# Patient Record
Sex: FEMALE | Race: OTHER | NOT HISPANIC OR LATINO | Employment: FULL TIME | URBAN - METROPOLITAN AREA
[De-identification: names, ages, dates, MRNs, and addresses within clinical notes are randomized per-mention and may not be internally consistent; named-entity substitution may affect disease eponyms.]

---

## 2024-01-04 ENCOUNTER — HOSPITAL ENCOUNTER (EMERGENCY)
Facility: HOSPITAL | Age: 19
Discharge: PSYCHIATRIC OR SUBSTANCE USE FACILITY | End: 2024-01-05
Attending: EMERGENCY MEDICINE
Payer: COMMERCIAL

## 2024-01-04 VITALS
SYSTOLIC BLOOD PRESSURE: 126 MMHG | TEMPERATURE: 98 F | DIASTOLIC BLOOD PRESSURE: 76 MMHG | OXYGEN SATURATION: 99 % | HEIGHT: 58 IN | HEART RATE: 68 BPM | RESPIRATION RATE: 20 BRPM | WEIGHT: 86 LBS | BODY MASS INDEX: 18.05 KG/M2

## 2024-01-04 DIAGNOSIS — R45.851 SUICIDAL IDEATION: Primary | ICD-10-CM

## 2024-01-04 LAB
ALBUMIN SERPL-MCNC: 4.7 G/DL (ref 3.5–5)
ALBUMIN/GLOB SERPL: 1.5 RATIO (ref 1.1–2)
ALP SERPL-CCNC: 37 UNIT/L (ref 40–150)
ALT SERPL-CCNC: 9 UNIT/L (ref 0–55)
AMPHET UR QL SCN: NEGATIVE
APAP SERPL-MCNC: <17.4 UG/ML (ref 17.4–30)
APPEARANCE UR: CLEAR
AST SERPL-CCNC: 20 UNIT/L (ref 5–34)
B-HCG SERPL QL: NEGATIVE
BACTERIA #/AREA URNS AUTO: ABNORMAL /HPF
BARBITURATE SCN PRESENT UR: NEGATIVE
BASOPHILS # BLD AUTO: 0.03 X10(3)/MCL
BASOPHILS NFR BLD AUTO: 0.4 %
BENZODIAZ UR QL SCN: NEGATIVE
BILIRUB SERPL-MCNC: 1.7 MG/DL
BILIRUB UR QL STRIP.AUTO: NEGATIVE
BUN SERPL-MCNC: 21.5 MG/DL (ref 7–18.7)
CALCIUM SERPL-MCNC: 9.3 MG/DL (ref 8.4–10.2)
CANNABINOIDS UR QL SCN: NEGATIVE
CHLORIDE SERPL-SCNC: 105 MMOL/L (ref 98–107)
CO2 SERPL-SCNC: 15 MMOL/L (ref 22–29)
COCAINE UR QL SCN: NEGATIVE
COLOR UR AUTO: YELLOW
CREAT SERPL-MCNC: 0.76 MG/DL (ref 0.55–1.02)
EOSINOPHIL # BLD AUTO: 0 X10(3)/MCL (ref 0–0.9)
EOSINOPHIL NFR BLD AUTO: 0 %
ERYTHROCYTE [DISTWIDTH] IN BLOOD BY AUTOMATED COUNT: 11.4 % (ref 11.5–17)
ETHANOL SERPL-MCNC: <10 MG/DL
FENTANYL UR QL SCN: NEGATIVE
GFR SERPLBLD CREATININE-BSD FMLA CKD-EPI: >60 MLS/MIN/1.73/M2
GLOBULIN SER-MCNC: 3.1 GM/DL (ref 2.4–3.5)
GLUCOSE SERPL-MCNC: 64 MG/DL (ref 74–100)
GLUCOSE UR QL STRIP.AUTO: NORMAL
HCT VFR BLD AUTO: 42.7 % (ref 37–47)
HGB BLD-MCNC: 14.2 G/DL (ref 12–16)
IMM GRANULOCYTES # BLD AUTO: 0.03 X10(3)/MCL (ref 0–0.04)
IMM GRANULOCYTES NFR BLD AUTO: 0.4 %
KETONES UR QL STRIP.AUTO: ABNORMAL
LEUKOCYTE ESTERASE UR QL STRIP.AUTO: NEGATIVE
LYMPHOCYTES # BLD AUTO: 1.65 X10(3)/MCL (ref 0.6–4.6)
LYMPHOCYTES NFR BLD AUTO: 20.2 %
MCH RBC QN AUTO: 30.8 PG (ref 27–31)
MCHC RBC AUTO-ENTMCNC: 33.3 G/DL (ref 33–36)
MCV RBC AUTO: 92.6 FL (ref 80–94)
MDMA UR QL SCN: NEGATIVE
MONOCYTES # BLD AUTO: 0.44 X10(3)/MCL (ref 0.1–1.3)
MONOCYTES NFR BLD AUTO: 5.4 %
MUCOUS THREADS URNS QL MICRO: ABNORMAL /LPF
NEUTROPHILS # BLD AUTO: 6.02 X10(3)/MCL (ref 2.1–9.2)
NEUTROPHILS NFR BLD AUTO: 73.6 %
NITRITE UR QL STRIP.AUTO: NEGATIVE
NRBC BLD AUTO-RTO: 0 %
OPIATES UR QL SCN: NEGATIVE
PCP UR QL: NEGATIVE
PH UR STRIP.AUTO: 5.5 [PH]
PH UR: 5.5 [PH] (ref 3–11)
PLATELET # BLD AUTO: 226 X10(3)/MCL (ref 130–400)
PMV BLD AUTO: 12.7 FL (ref 7.4–10.4)
POCT GLUCOSE: 164 MG/DL (ref 70–110)
POTASSIUM SERPL-SCNC: 4.3 MMOL/L (ref 3.5–5.1)
PROT SERPL-MCNC: 7.8 GM/DL (ref 6.4–8.3)
PROT UR QL STRIP.AUTO: ABNORMAL
RBC # BLD AUTO: 4.61 X10(6)/MCL (ref 4.2–5.4)
RBC #/AREA URNS AUTO: ABNORMAL /HPF
RBC UR QL AUTO: NEGATIVE
SALICYLATES SERPL-MCNC: <5 MG/DL (ref 15–30)
SARS-COV-2 RDRP RESP QL NAA+PROBE: NEGATIVE
SODIUM SERPL-SCNC: 135 MMOL/L (ref 136–145)
SP GR UR STRIP.AUTO: 1.04 (ref 1–1.03)
SPECIFIC GRAVITY, URINE AUTO (.000) (OHS): 1.04 (ref 1–1.03)
SQUAMOUS #/AREA URNS LPF: ABNORMAL /HPF
TSH SERPL-ACNC: 0.69 UIU/ML (ref 0.35–4.94)
UROBILINOGEN UR STRIP-ACNC: NORMAL
WBC # SPEC AUTO: 8.17 X10(3)/MCL (ref 4.5–11.5)
WBC #/AREA URNS AUTO: ABNORMAL /HPF

## 2024-01-04 PROCEDURE — 80143 DRUG ASSAY ACETAMINOPHEN: CPT | Performed by: PHYSICIAN ASSISTANT

## 2024-01-04 PROCEDURE — 82077 ASSAY SPEC XCP UR&BREATH IA: CPT | Performed by: PHYSICIAN ASSISTANT

## 2024-01-04 PROCEDURE — 82962 GLUCOSE BLOOD TEST: CPT

## 2024-01-04 PROCEDURE — 85025 COMPLETE CBC W/AUTO DIFF WBC: CPT | Performed by: PHYSICIAN ASSISTANT

## 2024-01-04 PROCEDURE — 80053 COMPREHEN METABOLIC PANEL: CPT | Performed by: PHYSICIAN ASSISTANT

## 2024-01-04 PROCEDURE — 87635 SARS-COV-2 COVID-19 AMP PRB: CPT | Performed by: PHYSICIAN ASSISTANT

## 2024-01-04 PROCEDURE — 84443 ASSAY THYROID STIM HORMONE: CPT | Performed by: PHYSICIAN ASSISTANT

## 2024-01-04 PROCEDURE — 99285 EMERGENCY DEPT VISIT HI MDM: CPT | Mod: 25

## 2024-01-04 PROCEDURE — 81025 URINE PREGNANCY TEST: CPT | Performed by: PHYSICIAN ASSISTANT

## 2024-01-04 PROCEDURE — 81001 URINALYSIS AUTO W/SCOPE: CPT | Mod: XB | Performed by: PHYSICIAN ASSISTANT

## 2024-01-04 PROCEDURE — 80179 DRUG ASSAY SALICYLATE: CPT | Performed by: PHYSICIAN ASSISTANT

## 2024-01-04 PROCEDURE — 80307 DRUG TEST PRSMV CHEM ANLYZR: CPT | Performed by: PHYSICIAN ASSISTANT

## 2024-01-05 ENCOUNTER — HOSPITAL ENCOUNTER (INPATIENT)
Facility: HOSPITAL | Age: 19
LOS: 4 days | Discharge: HOME | DRG: 885 | End: 2024-01-09
Attending: PSYCHIATRY & NEUROLOGY | Admitting: PSYCHIATRY & NEUROLOGY
Payer: COMMERCIAL

## 2024-01-05 DIAGNOSIS — F32.A DEPRESSION: ICD-10-CM

## 2024-01-05 LAB
ALBUMIN SERPL-MCNC: 4.3 G/DL (ref 3.5–5)
ALBUMIN/GLOB SERPL: 1.6 RATIO (ref 1.1–2)
ALP SERPL-CCNC: 36 UNIT/L (ref 40–150)
ALT SERPL-CCNC: 8 UNIT/L (ref 0–55)
AST SERPL-CCNC: 16 UNIT/L (ref 5–34)
BASOPHILS # BLD AUTO: 0.03 X10(3)/MCL
BASOPHILS NFR BLD AUTO: 0.4 %
BILIRUB SERPL-MCNC: 1.5 MG/DL
BUN SERPL-MCNC: 21.1 MG/DL (ref 7–18.7)
CALCIUM SERPL-MCNC: 9.6 MG/DL (ref 8.4–10.2)
CHLORIDE SERPL-SCNC: 106 MMOL/L (ref 98–107)
CHOLEST SERPL-MCNC: 177 MG/DL
CHOLEST/HDLC SERPL: 3 {RATIO} (ref 0–5)
CO2 SERPL-SCNC: 24 MMOL/L (ref 22–29)
CREAT SERPL-MCNC: 0.85 MG/DL (ref 0.55–1.02)
EOSINOPHIL # BLD AUTO: 0.1 X10(3)/MCL (ref 0–0.9)
EOSINOPHIL NFR BLD AUTO: 1.3 %
ERYTHROCYTE [DISTWIDTH] IN BLOOD BY AUTOMATED COUNT: 11.3 % (ref 11.5–17)
EST. AVERAGE GLUCOSE BLD GHB EST-MCNC: 93.9 MG/DL
GFR SERPLBLD CREATININE-BSD FMLA CKD-EPI: >60 MLS/MIN/1.73/M2
GLOBULIN SER-MCNC: 2.7 GM/DL (ref 2.4–3.5)
GLUCOSE SERPL-MCNC: 75 MG/DL (ref 74–100)
HBA1C MFR BLD: 4.9 %
HCT VFR BLD AUTO: 40.8 % (ref 37–47)
HDLC SERPL-MCNC: 58 MG/DL (ref 35–60)
HGB BLD-MCNC: 13.8 G/DL (ref 12–16)
IMM GRANULOCYTES # BLD AUTO: 0.02 X10(3)/MCL (ref 0–0.04)
IMM GRANULOCYTES NFR BLD AUTO: 0.3 %
LDLC SERPL CALC-MCNC: 110 MG/DL (ref 50–140)
LYMPHOCYTES # BLD AUTO: 2.84 X10(3)/MCL (ref 0.6–4.6)
LYMPHOCYTES NFR BLD AUTO: 35.5 %
MCH RBC QN AUTO: 30.8 PG (ref 27–31)
MCHC RBC AUTO-ENTMCNC: 33.8 G/DL (ref 33–36)
MCV RBC AUTO: 91.1 FL (ref 80–94)
MONOCYTES # BLD AUTO: 0.67 X10(3)/MCL (ref 0.1–1.3)
MONOCYTES NFR BLD AUTO: 8.4 %
NEUTROPHILS # BLD AUTO: 4.34 X10(3)/MCL (ref 2.1–9.2)
NEUTROPHILS NFR BLD AUTO: 54.1 %
NRBC BLD AUTO-RTO: 0 %
PLATELET # BLD AUTO: 232 X10(3)/MCL (ref 130–400)
PMV BLD AUTO: 13 FL (ref 7.4–10.4)
POTASSIUM SERPL-SCNC: 4.5 MMOL/L (ref 3.5–5.1)
PROT SERPL-MCNC: 7 GM/DL (ref 6.4–8.3)
RBC # BLD AUTO: 4.48 X10(6)/MCL (ref 4.2–5.4)
SODIUM SERPL-SCNC: 140 MMOL/L (ref 136–145)
T PALLIDUM AB SER QL: NONREACTIVE
TRIGL SERPL-MCNC: 46 MG/DL (ref 37–140)
TSH SERPL-ACNC: 2.09 UIU/ML (ref 0.35–4.94)
VLDLC SERPL CALC-MCNC: 9 MG/DL
WBC # SPEC AUTO: 8 X10(3)/MCL (ref 4.5–11.5)

## 2024-01-05 PROCEDURE — 85025 COMPLETE CBC W/AUTO DIFF WBC: CPT | Performed by: PSYCHIATRY & NEUROLOGY

## 2024-01-05 PROCEDURE — 86780 TREPONEMA PALLIDUM: CPT | Performed by: PSYCHIATRY & NEUROLOGY

## 2024-01-05 PROCEDURE — 84443 ASSAY THYROID STIM HORMONE: CPT | Performed by: PSYCHIATRY & NEUROLOGY

## 2024-01-05 PROCEDURE — 80061 LIPID PANEL: CPT | Performed by: PSYCHIATRY & NEUROLOGY

## 2024-01-05 PROCEDURE — 25000003 PHARM REV CODE 250

## 2024-01-05 PROCEDURE — 83036 HEMOGLOBIN GLYCOSYLATED A1C: CPT | Performed by: PSYCHIATRY & NEUROLOGY

## 2024-01-05 PROCEDURE — 80053 COMPREHEN METABOLIC PANEL: CPT | Performed by: PSYCHIATRY & NEUROLOGY

## 2024-01-05 PROCEDURE — 11400000 HC PSYCH PRIVATE ROOM

## 2024-01-05 RX ORDER — HYDROXYZINE HYDROCHLORIDE 50 MG/1
50 TABLET, FILM COATED ORAL EVERY 4 HOURS PRN
Status: DISCONTINUED | OUTPATIENT
Start: 2024-01-05 | End: 2024-01-09 | Stop reason: HOSPADM

## 2024-01-05 RX ORDER — IBUPROFEN 200 MG
1 TABLET ORAL DAILY
Status: DISCONTINUED | OUTPATIENT
Start: 2024-01-05 | End: 2024-01-09 | Stop reason: HOSPADM

## 2024-01-05 RX ORDER — ACETAMINOPHEN 325 MG/1
650 TABLET ORAL EVERY 6 HOURS PRN
Status: DISCONTINUED | OUTPATIENT
Start: 2024-01-05 | End: 2024-01-09 | Stop reason: HOSPADM

## 2024-01-05 RX ORDER — HALOPERIDOL 5 MG/ML
10 INJECTION INTRAMUSCULAR EVERY 4 HOURS PRN
Status: DISCONTINUED | OUTPATIENT
Start: 2024-01-05 | End: 2024-01-09 | Stop reason: HOSPADM

## 2024-01-05 RX ORDER — DIPHENHYDRAMINE HYDROCHLORIDE 50 MG/ML
50 INJECTION INTRAMUSCULAR; INTRAVENOUS EVERY 4 HOURS PRN
Status: DISCONTINUED | OUTPATIENT
Start: 2024-01-05 | End: 2024-01-09 | Stop reason: HOSPADM

## 2024-01-05 RX ORDER — HALOPERIDOL 5 MG/1
10 TABLET ORAL EVERY 4 HOURS PRN
Status: DISCONTINUED | OUTPATIENT
Start: 2024-01-05 | End: 2024-01-09 | Stop reason: HOSPADM

## 2024-01-05 RX ORDER — LORAZEPAM 1 MG/1
2 TABLET ORAL EVERY 4 HOURS PRN
Status: DISCONTINUED | OUTPATIENT
Start: 2024-01-05 | End: 2024-01-09 | Stop reason: HOSPADM

## 2024-01-05 RX ORDER — DIPHENHYDRAMINE HCL 50 MG
50 CAPSULE ORAL EVERY 4 HOURS PRN
Status: DISCONTINUED | OUTPATIENT
Start: 2024-01-05 | End: 2024-01-09 | Stop reason: HOSPADM

## 2024-01-05 RX ORDER — ALUMINUM HYDROXIDE, MAGNESIUM HYDROXIDE, AND SIMETHICONE 1200; 120; 1200 MG/30ML; MG/30ML; MG/30ML
30 SUSPENSION ORAL EVERY 6 HOURS PRN
Status: DISCONTINUED | OUTPATIENT
Start: 2024-01-05 | End: 2024-01-09 | Stop reason: HOSPADM

## 2024-01-05 RX ORDER — TRAZODONE HYDROCHLORIDE 100 MG/1
100 TABLET ORAL NIGHTLY PRN
Status: DISCONTINUED | OUTPATIENT
Start: 2024-01-05 | End: 2024-01-09 | Stop reason: HOSPADM

## 2024-01-05 RX ORDER — LORAZEPAM 2 MG/ML
2 INJECTION INTRAMUSCULAR EVERY 4 HOURS PRN
Status: DISCONTINUED | OUTPATIENT
Start: 2024-01-05 | End: 2024-01-09 | Stop reason: HOSPADM

## 2024-01-05 RX ADMIN — SERTRALINE HYDROCHLORIDE 75 MG: 50 TABLET ORAL at 10:01
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
## 2024-01-05 NOTE — NURSING
Order rec'd from CRISTINA Clark to change time for Zoloft to Qhs, start tomorrow night, pt states she takes it at night when at home.

## 2024-01-05 NOTE — H&P
[facility]  History & Physical    Subjective:      Chief Complaint/Reason for Admission: major depression    Renata Mera is a 18 y.o. female. Major depression with SI     No past medical history on file.  No past surgical history on file.  No family history on file.       No medications prior to admission.     Review of patient's allergies indicates:  No Known Allergies     Review of Systems   Constitutional: Negative.    HENT: Negative.     Eyes: Negative.    Respiratory: Negative.     Cardiovascular: Negative.    Gastrointestinal: Negative.    Genitourinary: Negative.    Musculoskeletal: Negative.    Skin: Negative.    Neurological: Negative.    Endo/Heme/Allergies: Negative.    Psychiatric/Behavioral:  Positive for depression and suicidal ideas. Negative for hallucinations and substance abuse.        Objective:      Vital Signs (Most Recent)  Temp: 98.4 °F (36.9 °C) (01/05/24 1100)  Pulse: 80 (01/05/24 1100)  Resp: 19 (01/05/24 1100)  BP: 113/77 (01/05/24 1100)  SpO2: 100 % (01/05/24 1100)    Vital Signs Range (Last 24H):  Temp:  [97.5 °F (36.4 °C)-98.4 °F (36.9 °C)]   Pulse:  []   Resp:  [18-20]   BP: (113-129)/(76-91)   SpO2:  [95 %-100 %]     Physical Exam    Data Review:    Recent Results (from the past 48 hour(s))   Urinalysis, Reflex to Urine Culture    Collection Time: 01/04/24  6:52 PM    Specimen: Urine, Clean Catch   Result Value Ref Range    Color, UA Yellow Yellow, Light-Yellow, Colorless, Straw, Dark-Yellow    Appearance, UA Clear Clear    Specific Gravity, UA 1.038 (H) 1.005 - 1.030    pH, UA 5.5 5.0 - 8.5    Protein, UA 1+ (A) Negative    Glucose, UA Normal Negative, Normal    Ketones, UA 4+ (A) Negative    Blood, UA Negative Negative    Bilirubin, UA Negative Negative    Urobilinogen, UA Normal 0.2, 1.0, Normal    Nitrites, UA Negative Negative    Leukocyte Esterase, UA Negative Negative    WBC, UA 0-5 None Seen, 0-2, 3-5, 0-5 /HPF    Bacteria, UA None  Seen None Seen, Trace /HPF    Squamous Epithelial Cells, UA Occasional (A) None Seen /HPF    Mucous, UA Trace (A) None Seen /LPF    RBC, UA 0-5 None Seen, 0-2, 3-5, 0-5 /HPF   Drug Screen, Urine    Collection Time: 01/04/24  6:52 PM   Result Value Ref Range    Amphetamines, Urine Negative Negative    Barbituates, Urine Negative Negative    Benzodiazepine, Urine Negative Negative    Cannabinoids, Urine Negative Negative    Cocaine, Urine Negative Negative    Fentanyl, Urine Negative Negative    MDMA, Urine Negative Negative    Opiates, Urine Negative Negative    Phencyclidine, Urine Negative Negative    pH, Urine 5.5 3.0 - 11.0    Specific Gravity, Urine Auto 1.038 (H) 1.001 - 1.035   COVID-19 Rapid Screening    Collection Time: 01/04/24  6:52 PM   Result Value Ref Range    SARS COV-2 MOLECULAR Negative Negative   Pregnancy, urine rapid    Collection Time: 01/04/24  6:52 PM   Result Value Ref Range    Beta hCG Qualitative, Urine Negative Negative   Comprehensive metabolic panel    Collection Time: 01/04/24  7:19 PM   Result Value Ref Range    Sodium Level 135 (L) 136 - 145 mmol/L    Potassium Level 4.3 3.5 - 5.1 mmol/L    Chloride 105 98 - 107 mmol/L    Carbon Dioxide 15 (L) 22 - 29 mmol/L    Glucose Level 64 (L) 74 - 100 mg/dL    Blood Urea Nitrogen 21.5 (H) 7.0 - 18.7 mg/dL    Creatinine 0.76 0.55 - 1.02 mg/dL    Calcium Level Total 9.3 8.4 - 10.2 mg/dL    Protein Total 7.8 6.4 - 8.3 gm/dL    Albumin Level 4.7 3.5 - 5.0 g/dL    Globulin 3.1 2.4 - 3.5 gm/dL    Albumin/Globulin Ratio 1.5 1.1 - 2.0 ratio    Bilirubin Total 1.7 (H) <=1.5 mg/dL    Alkaline Phosphatase 37 (L) 40 - 150 unit/L    Alanine Aminotransferase 9 0 - 55 unit/L    Aspartate Aminotransferase 20 5 - 34 unit/L    eGFR >60 mls/min/1.73/m2   TSH    Collection Time: 01/04/24  7:19 PM   Result Value Ref Range    TSH 0.690 0.350 - 4.940 uIU/mL   Ethanol    Collection Time: 01/04/24  7:19 PM   Result Value Ref Range    Ethanol Level <10.0 <=10.0 mg/dL    Acetaminophen level    Collection Time: 01/04/24  7:19 PM   Result Value Ref Range    Acetaminophen Level <17.4 (L) 17.4 - 30.0 ug/ml   Salicylate level    Collection Time: 01/04/24  7:19 PM   Result Value Ref Range    Salicylate Level <5.0 (L) 15.0 - 30.0 mg/dL   CBC with Differential    Collection Time: 01/04/24  7:19 PM   Result Value Ref Range    WBC 8.17 4.50 - 11.50 x10(3)/mcL    RBC 4.61 4.20 - 5.40 x10(6)/mcL    Hgb 14.2 12.0 - 16.0 g/dL    Hct 42.7 37.0 - 47.0 %    MCV 92.6 80.0 - 94.0 fL    MCH 30.8 27.0 - 31.0 pg    MCHC 33.3 33.0 - 36.0 g/dL    RDW 11.4 (L) 11.5 - 17.0 %    Platelet 226 130 - 400 x10(3)/mcL    MPV 12.7 (H) 7.4 - 10.4 fL    Neut % 73.6 %    Lymph % 20.2 %    Mono % 5.4 %    Eos % 0.0 %    Basophil % 0.4 %    Lymph # 1.65 0.6 - 4.6 x10(3)/mcL    Neut # 6.02 2.1 - 9.2 x10(3)/mcL    Mono # 0.44 0.1 - 1.3 x10(3)/mcL    Eos # 0.00 0 - 0.9 x10(3)/mcL    Baso # 0.03 <=0.2 x10(3)/mcL    IG# 0.03 0 - 0.04 x10(3)/mcL    IG% 0.4 %    NRBC% 0.0 %   POCT glucose    Collection Time: 01/04/24 11:47 PM   Result Value Ref Range    POCT Glucose 164 (H) 70 - 110 mg/dL   Hemoglobin A1C    Collection Time: 01/05/24  7:43 AM   Result Value Ref Range    Hemoglobin A1c 4.9 <=7.0 %    Estimated Average Glucose 93.9 mg/dL   TSH    Collection Time: 01/05/24  7:43 AM   Result Value Ref Range    TSH 2.086 0.350 - 4.940 uIU/mL   SYPHILIS ANTIBODY (WITH REFLEX RPR)    Collection Time: 01/05/24  7:43 AM   Result Value Ref Range    Syphilis Antibody Nonreactive Nonreactive, Equivocal   Lipid Panel    Collection Time: 01/05/24  7:43 AM   Result Value Ref Range    Cholesterol Total 177 <=200 mg/dL    HDL Cholesterol 58 35 - 60 mg/dL    Triglyceride 46 37 - 140 mg/dL    Cholesterol/HDL Ratio 3 0 - 5    Very Low Density Lipoprotein 9     LDL Cholesterol 110.00 50.00 - 140.00 mg/dL   Comprehensive Metabolic Panel    Collection Time: 01/05/24  7:43 AM   Result Value Ref Range    Sodium Level 140 136 - 145 mmol/L    Potassium  Level 4.5 3.5 - 5.1 mmol/L    Chloride 106 98 - 107 mmol/L    Carbon Dioxide 24 22 - 29 mmol/L    Glucose Level 75 74 - 100 mg/dL    Blood Urea Nitrogen 21.1 (H) 7.0 - 18.7 mg/dL    Creatinine 0.85 0.55 - 1.02 mg/dL    Calcium Level Total 9.6 8.4 - 10.2 mg/dL    Protein Total 7.0 6.4 - 8.3 gm/dL    Albumin Level 4.3 3.5 - 5.0 g/dL    Globulin 2.7 2.4 - 3.5 gm/dL    Albumin/Globulin Ratio 1.6 1.1 - 2.0 ratio    Bilirubin Total 1.5 <=1.5 mg/dL    Alkaline Phosphatase 36 (L) 40 - 150 unit/L    Alanine Aminotransferase 8 0 - 55 unit/L    Aspartate Aminotransferase 16 5 - 34 unit/L    eGFR >60 mls/min/1.73/m2   CBC with Differential    Collection Time: 01/05/24  7:43 AM   Result Value Ref Range    WBC 8.00 4.50 - 11.50 x10(3)/mcL    RBC 4.48 4.20 - 5.40 x10(6)/mcL    Hgb 13.8 12.0 - 16.0 g/dL    Hct 40.8 37.0 - 47.0 %    MCV 91.1 80.0 - 94.0 fL    MCH 30.8 27.0 - 31.0 pg    MCHC 33.8 33.0 - 36.0 g/dL    RDW 11.3 (L) 11.5 - 17.0 %    Platelet 232 130 - 400 x10(3)/mcL    MPV 13.0 (H) 7.4 - 10.4 fL    Neut % 54.1 %    Lymph % 35.5 %    Mono % 8.4 %    Eos % 1.3 %    Basophil % 0.4 %    Lymph # 2.84 0.6 - 4.6 x10(3)/mcL    Neut # 4.34 2.1 - 9.2 x10(3)/mcL    Mono # 0.67 0.1 - 1.3 x10(3)/mcL    Eos # 0.10 0 - 0.9 x10(3)/mcL    Baso # 0.03 <=0.2 x10(3)/mcL    IG# 0.02 0 - 0.04 x10(3)/mcL    IG% 0.3 %    NRBC% 0.0 %        No results found.       Assessment and Plan       Major depression
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
## 2024-01-05 NOTE — PROGRESS NOTES
"Renata is a 18 female admitted for Major Depressive Disorder, Recurrent: Severe Without Psychotic Features and Non suicidal self-harm with a -uds. CTRS met with Pt 1:1 at bedside, Renata was cooperative appearing depressed and reporting ability to perform her ADL's. CTRS educated Pt to TR group times and dates with Renata uncertain if she would attend TR groups, CTRS encouraged attendance as this is part of her treatment. Renata reported her treatment goal as "I don't know", CTRS utilized interdisciplinary treatment goal of Enhanced Social, Occupational or Functional Skills.       01/05/24 0804   General   Admit Date 01/05/24   Primary Diagnosis Major Depressive Disorder, Recurrent: Severe Without Psychotic Features   Secondary Diagnosis Non suicidal self-harm   Judaism None   Number of Children 0   Children Living? 0   Occupation    Does the patient have dentures? No   If you were to take part in activities, which of the following would you prefer? Activities that you do alone   Do you feel like you have enough to keep you busy now? Yes   Do you believe that you have the opportunity for physical activity? Yes   Activity Capabilities Minimum   Subjective   Patient states Self Harm   Assessment   Mobility ambulates independently   Transfers independently   Musculoskeletal   (none)   Visual Acuity normal vision   Visual Perception depth perception;color perception;recognizes letters;recognizes numbers   Hearing normal   Speech/Communication normal   Cognitive Concerns oriented x4   Emotional Concerns appears depressed   Leisure Interest Survey   Leisure Interest Survey Yes   Solitary Activities   Computer Activities Current Interest   Music Listening Current Interest   Reading Current Interest   Physical Activities   Fitness/Exercise Programs Current Interest   Walk/Run Current Interest   Creative Activities   Playing Musical Instru Current Interest   Photography Current Interest   Outdoor " "Activities   Hunting Would like to learn/do   Fishing Current Interest   Camping Current Interest   Horseback Riding Current Interest   Hiking Current Interest   Spectator Events   Concerts Current Interest   Passive Games   Other Passive Games   (sudoku)   Goals   Additional Documentation yes   Goal Formulation With patient   Time For Goal Achievement 7 days   Goal 1 "I don't know"  (Enhanced Social, Occupational or Functional Skills)   Plan   Planned Therapy Intervention Group Recreational Therapy   Expected Length of Stay 5-7days   PT Frequency Minimum of 3 visits per week       "

## 2024-01-05 NOTE — GROUP NOTE
Group Psychotherapy       Group Focus: Stress Management   Group Topic: Stress Management/Crisis Plan. Therapist assisted with understanding of treatment plan, identification of responsibilities for actions, assisted patients in identifying sources of support and developing awareness of crisis symptoms.    Number of patients in attendance: 5    Group Start Time: 1045  Group End Time:  1130  Groups Date: 1/5/2024  Group Topic:  Behavioral Health  Group Department: [facility]  Group Facilitators:  Shanda Bello  _____________________________________________________________________    Patient Name: Renata Mera  MRN: [id number]  Patient Class: IP- Psych   Admission Date\Time: 1/5/2024 12:58 AM  Hospital Length of Stay: 0  Primary Care Provider: Harvinder West PA     Referred by: Acute Psychiatry Unit Treatment Team     Target symptoms: Depression     Patient's response to treatment: Active Listening     Progress toward goals: Progressing slowly     Interval History:      Diagnosis:      Plan: Continue treatment on APU

## 2024-01-05 NOTE — FIRST PROVIDER EVALUATION
"Medical screening examination initiated.  I have conducted a focused provider triage encounter, findings are as follows:    Chief Complaint   Patient presents with    Suicidal     Pt. Reports SI, wants to stab self. Pt. Has superficial lacerations to bilateral upper extremities and bilateral lower extremities. Taking Zoloft for depression.      Brief history of present illness:  18 y.o. female presents to the ED with SI onset a few days ago with plan to stab self. Patient has been cutting, noted to bilateral wrists and bilateral upper thighs. Compliant with zoloft. Unsure of tetanus status     Vitals:    01/04/24 1823 01/04/24 1824   BP:  129/79   Pulse: (!) 138    Resp:  20   Temp: 98.2 °F (36.8 °C)    TempSrc: Oral    SpO2:  95%   Weight:  39 kg (86 lb)   Height:  4' 10" (1.473 m)     Pertinent physical exam:  Awake, alert, ambulatory, non-labored respirations, multiple superficial cuts to bilateral wrists and upper thighs, no active bleeding, patient tearful    Brief workup plan:  labs, UA, boostrix    Preliminary workup initiated; this workup will be continued and followed by the physician or advanced practice provider that is assigned to the patient when roomed.  "

## 2024-01-05 NOTE — NURSING
"Admission Note:    Renata Mera is a 18 y.o. female, : 2005, MRN: [id number], admitted on 2024 to [facility] ([facility]) for Harvinder Guerrero MD with a diagnosis of Depression [F32.A]. Patient admitted on a status of Physician Emergency Certificate (PEC). Renata reports no known food or drug allergies.    Patient demonstrated an affect that was flat. Patient demonstrated mood during assessment that was depressed. Patient had an appearance that was disheveled.  Patient denies suicidal ideation. Patient denies suicide plan. Patient denies hallucinations.    Renata's  height is 4' 9" (1.448 m) and weight is 36.7 kg (81 lb). Her oral temperature is 97.5 °F (36.4 °C). Her blood pressure is 129/87 and her pulse is 80. Her respiration is 18 and oxygen saturation is 99%.     Renata's last BM was noted on: _______    Metal detector screening performed via security personnel. The result of the scan was _______. Head-to-toe physical assessment completed with the following findings:  ________ found upon body screen. A full skin assessment was performed. Selins skin appeared _______.  Renata was oriented to unit, staff, peers, and room. Patient belongings/valuables stored in locked intake room cabinet and changes of clothing provided to patient. Renata was placed on Q 15 min observations.      "

## 2024-01-06 PROCEDURE — 25000003 PHARM REV CODE 250

## 2024-01-06 PROCEDURE — 11400000 HC PSYCH PRIVATE ROOM

## 2024-01-06 RX ADMIN — SERTRALINE HYDROCHLORIDE 75 MG: 50 TABLET ORAL at 08:01

## 2024-01-06 NOTE — PLAN OF CARE
Problem: Adult Inpatient Plan of Care  Goal: Plan of Care Review  Outcome: Ongoing, Progressing  Goal: Patient-Specific Goal (Individualized)  Outcome: Ongoing, Progressing  Goal: Absence of Hospital-Acquired Illness or Injury  Outcome: Ongoing, Progressing  Goal: Optimal Comfort and Wellbeing  Outcome: Ongoing, Progressing  Goal: Readiness for Transition of Care  Outcome: Ongoing, Progressing     Problem: Activity and Energy Impairment (Depressive Signs/Symptoms)  Goal: Optimized Energy Level (Depressive Signs/Symptoms)  Outcome: Ongoing, Progressing     Problem: Cognitive Impairment (Depressive Signs/Symptoms)  Goal: Optimized Cognitive Function  Outcome: Ongoing, Progressing     Problem: Decreased Participation/Engagement (Depressive Signs/Symptoms)  Goal: Increased Participation and Engagement (Depressive Signs/Symptoms)  Outcome: Ongoing, Progressing     Problem: Feelings of Worthlessness, Hopelessness or Excessive Guilt (Depressive Signs/Symptoms)  Goal: Enhanced Self-Esteem and Confidence (Depressive Signs/Symptoms)  Outcome: Ongoing, Progressing     Problem: Mood Impairment (Depressive Signs/Symptoms)  Goal: Improved Mood Symptoms (Depressive Signs/Symptoms)  Outcome: Ongoing, Progressing     Problem: Nutrition Imbalance (Depressive Signs/Symptoms)  Goal: Optimized Nutrition Intake  Outcome: Ongoing, Progressing     Problem: Psychomotor Impairment (Depressive Signs/Symptoms)  Goal: Improved Psychomotor Symptoms (Depressive Signs/Symptoms)  Outcome: Ongoing, Progressing     Problem: Sleep Disturbance (Depressive Signs/Symptoms)  Goal: Improved Sleep (Depressive Signs/Symptoms)  Outcome: Ongoing, Progressing     Problem: Social, Occupational or Functional Impairment (Depressive Signs/Symptoms)  Goal: Enhanced Social, Occupational or Functional Skills (Depressive Signs/Symptoms)  Outcome: Ongoing, Progressing     Problem: Impaired Wound Healing  Goal: Optimal Wound Healing  Outcome: Ongoing, Progressing      Problem: Violence Risk or Actual  Goal: Anger and Impulse Control  Outcome: Ongoing, Progressing

## 2024-01-06 NOTE — NURSING
"Daily Nursing Note:      Behavior:    Patient (Renata Mera is a 18 y.o. female, : 2005, MRN: [id number]) demonstrating an affect that was sad and flat. Renata demonstrating mood that is depressed. Renata had an appearance that was clean. Renata denies suicidal ideation. Renata denies suicide plan. Renata denies homicidal ideation. Renata denies hallucinations.    Renata's  height is 4' 9" (1.448 m) and weight is 36.7 kg (81 lb). Her oral temperature is 98.2 °F (36.8 °C). Her blood pressure is 104/70 and her pulse is 81. Her respiration is 18 and oxygen saturation is 95%.     Renata's last BM was noted on: _______      Intervention:    Encourage Renata to perform self-hygiene, grooming, and changing of clothing. Monitor Renata's behavior and program compliance. Monitor Renata for suicidal ideation, homicidal ideation, sleep disturbance, and hallucinations. Encourage Renata to eat all portions of meals and assess for meal preferences. Monitor Renata for intake and output to ensure hydration. Notify the Physician/Physician Assistant/Advance Practice Registered Nurse (MD/PA/APRN) for any medication refusal and any change in patient condition.      Response:    Renata verbalizes understand of unit process and procedures. Renata reported medications ______.      Plan:     Continue to monitor per MD/PA/APRN orders; maintain patient safety.   "

## 2024-01-06 NOTE — NURSING
Pt is currently voicing no ADRs or physical complaints at this time, vital signs are stable, pt is not in any physical distress at the current time, pt has superficial lacerations to upper and lower extremities bilaterally, no s/sxs of infection,  Currently voicing no suicidal or homicidal ideations at this time, pt reports decrease in depression and anxiety, voicing no hallucinations  Or delusions at this time, voicing no detoxing symptoms at this time, pt was seen by CRISTINA Clark yest., will monitor with suciide prec., for anger,   Psychosis and detox symptoms and will be assisted prn.  Pt requested for Zoloft to be given to her at night, she will receive it tonight.

## 2024-01-07 PROCEDURE — 11400000 HC PSYCH PRIVATE ROOM

## 2024-01-07 PROCEDURE — 25000003 PHARM REV CODE 250

## 2024-01-07 RX ADMIN — SERTRALINE HYDROCHLORIDE 75 MG: 50 TABLET ORAL at 08:01

## 2024-01-07 NOTE — NURSING
Pt is currently voicing no ADRs or physical complaints at this time, vital signs are stable, pt  Is not in any physical distress at the current time,  Currently voicing no suicidal or homicidal ideations at this time, reports decrease in depression and anxiety, voicing no hallucinations or delusions at this time, voicing no detox symptoms at this time, compliant with medication ordered, pt's mother visited her yest., she states  The visit went well, pt will be seen by Dr Guerrero tomorrow, will monitor with suicide prec., for anger, psychosis and detox symptoms and will be assisted prn.

## 2024-01-07 NOTE — NURSING
"Daily Nursing Note:      Behavior:    Patient (Renata Mera is a 18 y.o. female, : 2005, MRN: [id number]) demonstrating an affect that was anxious. Renata demonstrating mood that is anxious. Renata had an appearance that was good hygiene. Renata denies suicidal ideation. Renata denies suicide plan. Renata denies homicidal ideation. Renata denies hallucinations.    Renata's  height is 4' 9" (1.448 m) and weight is 36.7 kg (81 lb). Her oral temperature is 97.3 °F (36.3 °C). Her blood pressure is 106/73 and her pulse is 73. Her respiration is 16 and oxygen saturation is 97%.     Renata's last BM was noted on: _______      Intervention:    Encourage Renata to perform self-hygiene, grooming, and changing of clothing. Monitor Renata's behavior and program compliance. Monitor Renata for suicidal ideation, homicidal ideation, sleep disturbance, and hallucinations. Encourage Renata to eat all portions of meals and assess for meal preferences. Monitor Renata for intake and output to ensure hydration. Notify the Physician/Physician Assistant/Advance Practice Registered Nurse (MD/PA/APRN) for any medication refusal and any change in patient condition.      Response:    Renata verbalizes understand of unit process and procedures. Renata reported medications ______.      Plan:     Continue to monitor per MD/PA/APRN orders; maintain patient safety.   "

## 2024-01-08 PROBLEM — F33.2 MAJOR DEPRESSIVE DISORDER, RECURRENT, SEVERE WITHOUT PSYCHOTIC FEATURES: Status: ACTIVE | Noted: 2024-01-08

## 2024-01-08 PROCEDURE — 25000003 PHARM REV CODE 250

## 2024-01-08 PROCEDURE — 11400000 HC PSYCH PRIVATE ROOM

## 2024-01-08 RX ADMIN — SERTRALINE HYDROCHLORIDE 75 MG: 50 TABLET ORAL at 08:01

## 2024-01-08 NOTE — NURSING
"Daily Nursing Note:      Behavior:    Patient (Renata Mera is a 18 y.o. female, : 2005, MRN: [id number]) demonstrating an affect that was anxious. Renata demonstrating mood that is anxious. Renata had an appearance that was clean. Renata denies suicidal ideation. Renata denies suicide plan. Renata denies homicidal ideation. Renata denies hallucinations.    Renata's  height is 4' 9" (1.448 m) and weight is 36.7 kg (81 lb). Her temperature is 97.9 °F (36.6 °C). Her blood pressure is 126/81 and her pulse is 73. Her respiration is 16 and oxygen saturation is 98%.     Renata's last BM was noted on: _______      Intervention:    Encourage Renata to perform self-hygiene, grooming, and changing of clothing. Monitor Renata's behavior and program compliance. Monitor Renata for suicidal ideation, homicidal ideation, sleep disturbance, and hallucinations. Encourage Renata to eat all portions of meals and assess for meal preferences. Monitor Renata for intake and output to ensure hydration. Notify the Physician/Physician Assistant/Advance Practice Registered Nurse (MD/PA/APRN) for any medication refusal and any change in patient condition.      Response:    Renata verbalizes understand of unit process and procedures. Renata reported medications ______.      Plan:     Continue to monitor per MD/PA/APRN orders; maintain patient safety.   "

## 2024-01-08 NOTE — NURSING
Pt is currently voicing no ADRs or physical complaints at this time, vital signs are stable, pt  Is not in any physical distress at the current time,  Currently voicing no suicidal or homicidal ideations at this time, voicing no hallucinations or delusions at this time, voicing no detox symptoms at this time, pt reports decr in depression and anxiety since admit, pt states she was seen by Dr Guerrero today, will monitor with suicide prec., for anger, psychosis and detox symptoms and will be assisted prn.

## 2024-01-08 NOTE — PROGRESS NOTES
"1/8/2024  Renata Mera   2005   [id number]        Psychiatry Progress Note     Chief Complaint: "I'm doing better"    SUBJECTIVE:   Renata Mera is a 18 y.o. female placed under a PEC at [facility] due to suicidal ideation with thoughts of stabbing herself.    Today, she states that she has been feeling better.  Tolerating current medication regimen without issues.  Denies thoughts of self-harm or harm to others.  No overt behavioral issues reported by staff.  Sleeping and eating well.  States that her current plan is to return home and will follow-up with KYLE Monahan.  Will continue her current plan of care and will adjust regimen as needed.    UDS: (-)  Blood alcohol: <10    Current Medications:   Scheduled Meds:    nicotine  1 patch Transdermal Daily    sertraline  75 mg Oral QHS      PRN Meds: acetaminophen, aluminum-magnesium hydroxide-simethicone, haloperidoL **AND** diphenhydrAMINE **AND** LORazepam **AND** haloperidol lactate **AND** diphenhydrAMINE **AND** lorazepam, hydrOXYzine HCL, traZODone   Psychotherapeutics (From admission, onward)      Start     Stop Route Frequency Ordered    01/06/24 2100  sertraline tablet 75 mg         -- Oral Nightly 01/05/24 1644    01/05/24 0114  traZODone tablet 100 mg         -- Oral Nightly PRN 01/05/24 0106    01/05/24 0110  haloperidoL tablet 10 mg  (Med - Acute  Behavioral Management)        See Hyperspace for full Linked Orders Report.    -- Oral Every 4 hours PRN 01/05/24 0106    01/05/24 0110  LORazepam tablet 2 mg  (Med - Acute  Behavioral Management)        See Hyperspace for full Linked Orders Report.    -- Oral Every 4 hours PRN 01/05/24 0106    01/05/24 0110  haloperidol lactate injection 10 mg  (Med - Acute  Behavioral Management)        See Hyperspace for full Linked Orders Report.    -- IM Every 4 hours PRN 01/05/24 0106    01/05/24 0110  LORazepam injection 2 mg  (Med - Acute  Behavioral Management)        See Hyperspace for full Linked Orders Report.    -- " "IM Every 4 hours PRN 01/05/24 0106            Allergies:   Review of patient's allergies indicates:  No Known Allergies     OBJECTIVE:   Vitals   Vitals:    01/08/24 0701   BP: 118/75   Pulse: 84   Resp: 18   Temp: 98.1 °F (36.7 °C)        Labs/Imaging/Studies:   No results found for this or any previous visit (from the past 36 hour(s)).       Medical Review Of Systems:  Constitutional: negative  Respiratory: negative  Cardiovascular: negative  Gastrointestinal: negative  Genitourinary:negative  Musculoskeletal:negative  Neurological: negative       Psychiatric Mental Status Exam:  General Appearance: appears stated age, well-developed, well-nourished  Arousal: alert  Behavior: cooperative  Movements and Motor Activity: no abnormal involuntary movements noted  Orientation: oriented to person, place, time, and situation  Speech: normal rate, normal rhythm, normal volume, normal tone  Mood: "Better"  Affect: constricted  Thought Process: linear  Associations: intact  Thought Content and Perceptions: denies acute suicidal ideation, no homicidal ideation, no auditory hallucinations, no visual hallucinations, no paranoid ideation, no ideas of reference  Recent and Remote Memory: recent memory intact, remote memory intact; per interview/observation with patient  Attention and Concentration: intact, attentive to conversation; per interview/observation with patient  Fund of Knowledge: intact, aware of current events, vocabulary appropriate; based on history, vocabulary, fund of knowledge, syntax, grammar, and content  Insight: questionable; based on understanding of severity of illness and HPI  Judgment: questionable; based on patient's behavior and HPI     ASSESSMENT/PLAN:   Problems Addressed/Diagnoses:  Major Depressive Disorder, recurrent, severe (F33.2)    No past medical history on file.     Plan:  Depression, chronic with acute exacerbation  -Continue Zoloft    Expected Disposition Plan: Home        Harvinder MCGEE" BINTA Guerrero.

## 2024-01-09 VITALS
WEIGHT: 81 LBS | OXYGEN SATURATION: 99 % | DIASTOLIC BLOOD PRESSURE: 83 MMHG | BODY MASS INDEX: 17.47 KG/M2 | RESPIRATION RATE: 18 BRPM | HEART RATE: 62 BPM | SYSTOLIC BLOOD PRESSURE: 120 MMHG | TEMPERATURE: 98 F | HEIGHT: 57 IN

## 2024-01-09 RX ORDER — SERTRALINE HYDROCHLORIDE 25 MG/1
75 TABLET, FILM COATED ORAL NIGHTLY
Qty: 90 TABLET | Refills: 0 | Status: SHIPPED | OUTPATIENT
Start: 2024-01-09 | End: 2024-02-08

## 2024-01-09 RX ORDER — IBUPROFEN 200 MG
1 TABLET ORAL DAILY
Qty: 28 PATCH | Refills: 0 | Status: SHIPPED | OUTPATIENT
Start: 2024-01-10 | End: 2024-02-07

## 2024-01-09 NOTE — NURSING
"Daily Nursing Note:      Behavior:    Patient (Renata Mera is a 18 y.o. female, : 2005, MRN: [id number]) demonstrating an affect that was  labile. Renata demonstrating mood that is pleasant and appropriate. Renata had an appearance that was clean. Renata denies suicidal ideation. Renata denies suicide plan. Renata denies homicidal ideation. Renata denies hallucinations.    Renata's  height is 4' 9" (1.448 m) and weight is 36.7 kg (81 lb). Her oral temperature is 98.1 °F (36.7 °C). Her blood pressure is 118/75 and her pulse is 84. Her respiration is 18 and oxygen saturation is 98%.     Renata's last BM was noted on: _______      Intervention:    Encourage Renata to perform self-hygiene, grooming, and changing of clothing. Monitor Renata's behavior and program compliance. Monitor Renata for suicidal ideation, homicidal ideation, sleep disturbance, and hallucinations. Encourage Renata to eat all portions of meals and assess for meal preferences. Monitor Renata for intake and output to ensure hydration. Notify the Physician/Physician Assistant/Advance Practice Registered Nurse (MD/PA/APRN) for any medication refusal and any change in patient condition.      Response:    Renata verbalizes understand of unit process and procedures. Renata reported medications ______.      Plan:     Continue to monitor per MD/PA/APRN orders; maintain patient safety.   "

## 2024-01-09 NOTE — PLAN OF CARE
Renata met interdisciplinary treatment goal of Enhanced Social, Occupational or Functional Skills.  CTRS Discharge Recommendations:  Encouraged Pt. to actively utilize available community resources to increase leisure involvement to decrease signs and symptoms of illness.  Encouraged Pt. to utilize coping skills on a regular basis to reduce the risk of decomposition and re-hospitalization.

## 2024-01-09 NOTE — PROGRESS NOTES
"1/9/2024  Renata Mera   2005   [id number]        Psychiatry Progress Note     Chief Complaint: "I'm doing better"    SUBJECTIVE:   Renata Mera is a 18 y.o. female placed under a PEC at [facility] due to suicidal ideation with thoughts of stabbing herself.    Today, she states that she has been feeling great.  Tolerating current medication regimen without issues.  Denies thoughts of self-harm or harm to others.  No overt behavioral issues reported by staff.  Sleeping and eating well.  Will continue her current plan of care and will discharge home today.    UDS: (-)  Blood alcohol: <10    Current Medications:   Scheduled Meds:    nicotine  1 patch Transdermal Daily    sertraline  75 mg Oral QHS      PRN Meds: acetaminophen, aluminum-magnesium hydroxide-simethicone, haloperidoL **AND** diphenhydrAMINE **AND** LORazepam **AND** haloperidol lactate **AND** diphenhydrAMINE **AND** lorazepam, hydrOXYzine HCL, traZODone   Psychotherapeutics (From admission, onward)      Start     Stop Route Frequency Ordered    01/06/24 2100  sertraline tablet 75 mg         -- Oral Nightly 01/05/24 1644    01/05/24 0114  traZODone tablet 100 mg         -- Oral Nightly PRN 01/05/24 0106    01/05/24 0110  haloperidoL tablet 10 mg  (Med - Acute  Behavioral Management)        See Hyperspace for full Linked Orders Report.    -- Oral Every 4 hours PRN 01/05/24 0106    01/05/24 0110  LORazepam tablet 2 mg  (Med - Acute  Behavioral Management)        See Hyperspace for full Linked Orders Report.    -- Oral Every 4 hours PRN 01/05/24 0106    01/05/24 0110  haloperidol lactate injection 10 mg  (Med - Acute  Behavioral Management)        See Hyperspace for full Linked Orders Report.    -- IM Every 4 hours PRN 01/05/24 0106    01/05/24 0110  LORazepam injection 2 mg  (Med - Acute  Behavioral Management)        See Hyperspace for full Linked Orders Report.    -- IM Every 4 hours PRN 01/05/24 0106            Allergies:   Review of patient's allergies " "indicates:  No Known Allergies     OBJECTIVE:   Vitals   Vitals:    01/08/24 0701   BP: 118/75   Pulse: 84   Resp: 18   Temp: 98.1 °F (36.7 °C)        Labs/Imaging/Studies:   No results found for this or any previous visit (from the past 36 hour(s)).       Medical Review Of Systems:  Constitutional: negative  Respiratory: negative  Cardiovascular: negative  Gastrointestinal: negative  Genitourinary:negative  Musculoskeletal:negative  Neurological: negative       Psychiatric Mental Status Exam:  General Appearance: appears stated age, well-developed, well-nourished  Arousal: alert  Behavior: cooperative  Movements and Motor Activity: no abnormal involuntary movements noted  Orientation: oriented to person, place, time, and situation  Speech: normal rate, normal rhythm, normal volume, normal tone  Mood: "Great"  Affect: Mood congruent  Thought Process: linear  Associations: intact  Thought Content and Perceptions: denies acute suicidal ideation, no homicidal ideation, no auditory hallucinations, no visual hallucinations, no paranoid ideation, no ideas of reference  Recent and Remote Memory: recent memory intact, remote memory intact; per interview/observation with patient  Attention and Concentration: intact, attentive to conversation; per interview/observation with patient  Fund of Knowledge: intact, aware of current events, vocabulary appropriate; based on history, vocabulary, fund of knowledge, syntax, grammar, and content  Insight: questionable; based on understanding of severity of illness and HPI  Judgment: questionable; based on patient's behavior and HPI     ASSESSMENT/PLAN:   Problems Addressed/Diagnoses:  Major Depressive Disorder, recurrent, severe (F33.2)    No past medical history on file.     Plan:  Depression, chronic with acute exacerbation  -Continue Zoloft    Expected Disposition Plan: Home        KYLE Martel-BC    "

## 2024-01-09 NOTE — NURSING
Pt is scheduled for discharge today, currently voicing no ADRs or physical complaints at this time, vital signs are stable, pt is not in any physical distress at the current time, currently voicing no suicidal or homicidal ideations at this time, voicing no hallucinations or delusions at this time, voicing no detox symptoms at this time, pt was given dc instructions, voiced understanding,   Pt will receive a dc packet which will contain an RX for dc meds, aftercare appts, lab work and educational material.  Belongings will be packed by mht, pt states her mother will pick her up to bring her home, pt will be brought to dc area when her transportation arrives.
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
